# Patient Record
Sex: FEMALE | Race: BLACK OR AFRICAN AMERICAN | NOT HISPANIC OR LATINO | URBAN - METROPOLITAN AREA
[De-identification: names, ages, dates, MRNs, and addresses within clinical notes are randomized per-mention and may not be internally consistent; named-entity substitution may affect disease eponyms.]

---
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
## 2025-02-04 NOTE — ED PROVIDER NOTE - NORMAL, MLM
[facility]    ED Boarding Nurse Handoff Addendum Report:    Date/time: 2/4/2025, 9:30 AM    Activity Level: independent    Fall Risk: Yes:  nonskid shoes/slippers when out of bed, activity supervised, and toileting schedule implemented    Active Infusions: needs blood after consent and administration of Benadryl     Current Meds Due: none    Current care needs: hg 6.9 needs a unit of blood     Oxygen requirements (liters/min and/or FiO2): none    Respiratory status: Room air    Vital signs (within last 30 minutes):    Vitals:    02/04/25 0452 02/04/25 0726 02/04/25 0730 02/04/25 0737   BP: (!) 164/102  (!) 168/115 (!) 160/103   BP Location: Right arm      Patient Position: Supine      Cuff Size: Adult Regular      Pulse: 86  85 75   Resp: 22      Temp:  97.8  F (36.6  C)     TempSrc:  Oral     SpO2: 95%  94%    Weight:       Height:           Focused assessment within last 30 minutes:    None     ED Boarding Nurse name: David Larson RN    enrique all pertinent systems normal